# Patient Record
(demographics unavailable — no encounter records)

---

## 2025-04-22 NOTE — DISCUSSION/SUMMARY
[de-identified] : I spoke with the urgent care provider, reviewed notes, and images.  The patients condition is acute.  Confounding medical conditions/concerns: None  Tests/Studies Independently Interpreted Today: MRI right shoulder (PA 4/12/25) reveals evidence of mild anterior capsule sprain and bone marrow edema about proximal humerus, no large displaced labral repair.   We reviewed the MRI findings and discussed their diagnosis and treatment options at length including the risks and benefits of both surgical and non-surgical options for the patients anterior capsule sprain given no large labral tearing.  Cautiously optimistic that this will heal and resolve through proper rest and rehab. Some of his pain is likely also related to his proximal humeral physis, especially given contact injury to effected area.    We discussed the indication of a possible anterior Bankart tearing of which may not have been picked up on with a non-contrast story. The patient will continue on a month course of rehab and oral NSAIDs, if his condition is not significantly improving despite such modalities we will discuss obtaining an mr  arthrogram.   Physical therapy is prescribed for 3x a week for 4-6 weeks until re- evaluation , for pain relief and to decrease inflammation and to  strengthen  He will remain out of rugby for the remainder of the week into the weekend, start to advance back into practice next week and game next weekend. pain is his guide.   Discontinue use of sling  He may work in non-contact strengthening & conditioning as of Tuesday, return to contact next Friday  Continue PRN use of previously prescribed Naproxen 500mg, discussed risks of side effects, as well as timing/management of medication.  Side effects can include but are not limited to gastrointestinal ulcers and irritation, kidney failure, and bleeding issues. Use as directed and take with food to manage pain, inflammation, and discomfort.  Follow up in one week to re-evaluate condition

## 2025-04-22 NOTE — RETURN TO WORK/SCHOOL
[FreeTextEntry1] :    Diagnosis: right shoulder anterior capsule strain & proximal humerus apophysitis    X: As of 4/22/25 the patient may participate in non-contact gym & sports - he may work on strengthening & conditioning. He may return to contact gym/sports as of 4/25/25   Sincerely, Sidney Reyes MD Co- Chief Sports Medicine [facility] Medical Director [facility] and [facility]  [facility] [employer]

## 2025-04-22 NOTE — RETURN TO WORK/SCHOOL
[FreeTextEntry1] :    Diagnosis: right shoulder anterior capsule strain & proximal humerus apophysitis    X: As of 4/22/25 the patient may participate in non-contact gym & sports - he may work on strengthening & conditioning. He may return to contact gym/sports as of 4/25/25   Sincerely, Sidney Reyes MD Co- Chief Sports Medicine [facility] Medical Director [facility] and [facility]  [facility] [employer]

## 2025-04-22 NOTE — DATA REVIEWED
[MRI] : MRI normal balance [Right] : of the right [Shoulder] : shoulder [Report was reviewed and noted in the chart] : The report was reviewed and noted in the chart [I independently reviewed and interpreted images and report] : I independently reviewed and interpreted images and report [I reviewed the films/CD] : I reviewed the films/CD [FreeTextEntry1] : MRI right shoulder (PA 4/12/25) reveals evidence of mild anterior capsule sprain and bone marrow edema about proximal humerus, no large displaced labral repair 15

## 2025-04-22 NOTE — DISCUSSION/SUMMARY
[de-identified] : I spoke with the urgent care provider, reviewed notes, and images.  The patients condition is acute.  Confounding medical conditions/concerns: None  Tests/Studies Independently Interpreted Today: MRI right shoulder (PA 4/12/25) reveals evidence of mild anterior capsule sprain and bone marrow edema about proximal humerus, no large displaced labral repair.   We reviewed the MRI findings and discussed their diagnosis and treatment options at length including the risks and benefits of both surgical and non-surgical options for the patients anterior capsule sprain given no large labral tearing.  Cautiously optimistic that this will heal and resolve through proper rest and rehab. Some of his pain is likely also related to his proximal humeral physis, especially given contact injury to effected area.    We discussed the indication of a possible anterior Bankart tearing of which may not have been picked up on with a non-contrast story. The patient will continue on a month course of rehab and oral NSAIDs, if his condition is not significantly improving despite such modalities we will discuss obtaining an mr  arthrogram.   Physical therapy is prescribed for 3x a week for 4-6 weeks until re- evaluation , for pain relief and to decrease inflammation and to  strengthen  He will remain out of rugby for the remainder of the week into the weekend, start to advance back into practice next week and game next weekend. pain is his guide.   Discontinue use of sling  He may work in non-contact strengthening & conditioning as of Tuesday, return to contact next Friday  Continue PRN use of previously prescribed Naproxen 500mg, discussed risks of side effects, as well as timing/management of medication.  Side effects can include but are not limited to gastrointestinal ulcers and irritation, kidney failure, and bleeding issues. Use as directed and take with food to manage pain, inflammation, and discomfort.  Follow up in one week to re-evaluate condition

## 2025-04-22 NOTE — HISTORY OF PRESENT ILLNESS
[de-identified] :  leida lyons 14y/o M here with RT shoulder pain starting 4/11/25 from impact during a rugby game. Using a sling with some relief. Tried ice and naproxen with minimal relief. Pt states pain today is a 6 and has xrays per ELINOR GUILLAUME and MRI from [facility].

## 2025-04-22 NOTE — PHYSICAL EXAM
1.) Medication changes:   Increase Levemir to 25 units every evening  Continue Trulicity, metformin and glipizide     Schedule with EYE doctor soon.      2.) Test blood sugar: two times a day.  Record your blood sugars.  Report your recorded blood sugars to clinic in 1-2 weeks via phone, mail, or [facility].    3.) Diet: 45-60 grams of carbohydrates/meal  4.) Exercise:  At least 150 minutes a week     A1c:    Hemoglobin A1C (%)   Date Value   07/06/2022 6.7 (H)   07/12/2021 7.3 (H)     Hemoglobin A1C, POC (%)   Date Value   11/21/2022 6.9 (A)   03/23/2022 7.2 (A)     Goal A1c: Less than 7%  Pre meal blood glucose goals:    2 hour post meal blood glucose goal: Less than 180    Lipids:    LDL (mg/dL)   Date Value   01/07/2022 56   01/08/2021 75     Goal LDL:  Less than 70 (goal per AHA/ACC recommendation)    Urine test for protein:    Microalbumin/ Creatinine Ratio (mg/g)   Date Value   01/12/2022 9.0      Goal:  Less than 30.    Blood pressure:    Visit Vitals  /58   Pulse 73   Ht 5' 10\" (1.778 m)   Wt 115.7 kg (255 lb)   SpO2 97%   BMI 36.59 kg/m²      Goal:  Less than 140/90    Eye exam:  Obtain eye exam every year.    Foot exam:  Recommend daily foot inspection and care. Do not to go barefoot and avoid foot wear that pinches the foot or toes.    Flu shot:  Obtain flu vaccine every year.    Dental:  Visit the dentist twice a year or once a year if have dentures.      Treatment of low blood sugar.  Rule of 15's: If blood sugar is less than 70, eat or drink 15 grams of fast acting carbohydrate (1/2 cup juice, 1/2 cup regular soda, 4 glucose tab, etc.) every 15 minutes until blood sugar is over 70.  Eat next meal or small snack containing carbohydrate and protein.    For severe low blood sugar: If unable to eat or unconscious, you may be given a glucagon injection (prescription kit) by a family member or friend.    Treatment of high blood sugar.  Test blood sugar regularly. Call healthcare provider  if you have more than two consecutive numbers over 250, if there is any nausea, vomiting or signs of dehydration.  If after clinic hours, weekends or holidays to call your primary care provider.      Take diabetes medications and drink sugar free fluids.  Test for urine ketones if you have been prescribed ketone test strips. Call provider if you have moderate or large ketones.  If after clinic hours, weekends or holidays to call your primary care provider.    Please be aware that if you have an active [facility] account, you will be able to see your laboratory test results before Dr. Mahoney, Dr. Connors, or Ania Whyte NP have reviewed and advised upon them. Please allow the ordering provider time to review your results and make recommendations. If you are concerned about your test results, or are concerned about the length of time you are waiting to hear back from the clinic, please feel free to call and leave a message. We are always happy to hear from you.    Pranay, it was our pleasure serving you today. We thank you for choosing [facility] as your health care provider.    Because we value your input, you may receive a survey in the mail/email. Our priority is to provide you with excellent care and services so please take the time to complete the survey and return it. Please be aware that you do not have to answer survey questions that did not apply to your office visit. Your answers will help us change and grow to provide exceptional service. Thank you in advance for your assistance in helping [facility] build a stronger care team.    Thank you for your trust and confidence in us!        [Right] : right shoulder [Sitting] : sitting [Mild] : mild [4 ___] : forward flexion 4[unfilled]/5 [4___] : abduction 4[unfilled]/5 [] : no sensory deficits [TWNoteComboBox4] : passive forward flexion 170 degrees [TWNoteComboBox9] : passive abduction 170 degrees

## 2025-04-22 NOTE — HISTORY OF PRESENT ILLNESS
[de-identified] :  leida lyons 14y/o M here with RT shoulder pain starting 4/11/25 from impact during a rugby game. Using a sling with some relief. Tried ice and naproxen with minimal relief. Pt states pain today is a 6 and has xrays per ELINOR GUILLAUME and MRI from [facility].

## 2025-04-22 NOTE — PHYSICAL EXAM
[Right] : right shoulder [Sitting] : sitting [Mild] : mild [4 ___] : forward flexion 4[unfilled]/5 [4___] : abduction 4[unfilled]/5 [] : no sensory deficits [TWNoteComboBox4] : passive forward flexion 170 degrees [TWNoteComboBox9] : passive abduction 170 degrees

## 2025-04-22 NOTE — DATA REVIEWED
[MRI] : MRI [Right] : of the right [Shoulder] : shoulder [Report was reviewed and noted in the chart] : The report was reviewed and noted in the chart [I independently reviewed and interpreted images and report] : I independently reviewed and interpreted images and report [I reviewed the films/CD] : I reviewed the films/CD [FreeTextEntry1] : MRI right shoulder (PA 4/12/25) reveals evidence of mild anterior capsule sprain and bone marrow edema about proximal humerus, no large displaced labral repair

## 2025-04-29 NOTE — RETURN TO WORK/SCHOOL
[FreeTextEntry1] :    Diagnosis: right shoulder anterior capsule strain    X: Patient may return to gym/sports May 08, 2025     X: Patient may return to full activities May 08, 2025     Sincerely, Sidney Reyes MD Co- Chief Sports Medicine [facility] Medical Director [facility] and [facility]  [facility] [facility] [employer]

## 2025-04-29 NOTE — DISCUSSION/SUMMARY
[de-identified] : I spoke with VIKA Dudley, reviewed notes, and images.  The patients condition is acute.  Confounding medical conditions/concerns: None  Tests/Studies Independently Interpreted Today: Re-reviewed MRI right shoulder (PA 4/12/25) revealing evidence of mild anterior capsule sprain and bone marrow edema about proximal humerus, no large displaced labral repair.   At this time, the patients condition is improving on a gradual, interval basis. We recommended he continue on current course treating his anterior capsule sprain through rehab and oral NSAIDs before ordering an arthrogram - may aggravate his shoulder joint. We discussed the indication of a possible anterior Bankart tearing of which may not have been picked up on with a non-contrast story. If his condition regresses, we will obtain an arthrogram.   Continue physical therapy, attending 2-3x/week for approximately 4-6 weeks   The patient was advised to let pain guide the gradual advancement of activities. He will remain out of gym & sports until next Thursday, thereafter they have no activity restrictions at this time. Discussed the importance of increasing activity on an interval basis.   Continue PRN use of previously prescribed Naproxen 500mg, discussed risks of side effects, as well as timing/management of medication.  Side effects can include but are not limited to gastrointestinal ulcers and irritation, kidney failure, and bleeding issues. Use as directed and take with food to manage pain, inflammation, and discomfort.  Follow up in 2-3 weeks, if discomfort persists we will order an MR-A

## 2025-04-29 NOTE — RETURN TO WORK/SCHOOL
[FreeTextEntry1] :    Diagnosis: right shoulder anterior capsule strain    X: Patient may return to gym/sports May 08, 2025     X: Patient may return to full activities May 08, 2025     Sincerely, Sidney Reyes MD Co- Chief Sports Medicine [facility] Medical Director [facility] and [facility]  [employer] [facility] [employer]

## 2025-04-29 NOTE — HISTORY OF PRESENT ILLNESS
[de-identified] : leida Nagy 15 y/o male complaining of Rt shoulder pain 04/11/25. Pt has been attending physical therapy 3x a week at [facility] per ELINOR Larson. Pain scale 5/10. Pt states limited range of motion and pain in Rt shoulder when lifting RT arm. Pt is no longer taking naproxen.

## 2025-04-29 NOTE — DISCUSSION/SUMMARY
[de-identified] : I spoke with VIKA Dudley, reviewed notes, and images.  The patients condition is acute.  Confounding medical conditions/concerns: None  Tests/Studies Independently Interpreted Today: Re-reviewed MRI right shoulder (PA 4/12/25) revealing evidence of mild anterior capsule sprain and bone marrow edema about proximal humerus, no large displaced labral repair.   At this time, the patients condition is improving on a gradual, interval basis. We recommended he continue on current course treating his anterior capsule sprain through rehab and oral NSAIDs before ordering an arthrogram - may aggravate his shoulder joint. We discussed the indication of a possible anterior Bankart tearing of which may not have been picked up on with a non-contrast story. If his condition regresses, we will obtain an arthrogram.   Continue physical therapy, attending 2-3x/week for approximately 4-6 weeks   The patient was advised to let pain guide the gradual advancement of activities. He will remain out of gym & sports until next Thursday, thereafter they have no activity restrictions at this time. Discussed the importance of increasing activity on an interval basis.   Continue PRN use of previously prescribed Naproxen 500mg, discussed risks of side effects, as well as timing/management of medication.  Side effects can include but are not limited to gastrointestinal ulcers and irritation, kidney failure, and bleeding issues. Use as directed and take with food to manage pain, inflammation, and discomfort.  Follow up in 2-3 weeks, if discomfort persists we will order an MR-A

## 2025-04-29 NOTE — HISTORY OF PRESENT ILLNESS
[de-identified] : leida Nagy 15 y/o male complaining of Rt shoulder pain 04/11/25. Pt has been attending physical therapy 3x a week at [facility] per ELINOR Larson. Pain scale 5/10. Pt states limited range of motion and pain in Rt shoulder when lifting RT arm. Pt is no longer taking naproxen.

## 2025-04-29 NOTE — PHYSICAL EXAM
[Right] : right shoulder [NL (0-180)] : full passive abduction 0-180 degrees [Sitting] : sitting [Mild] : mild [5 ___] : forward flexion 5[unfilled]/5 [5___] : abduction 5[unfilled]/5 [] : no sensory deficits [TWNoteComboBox4] : False [TWNoteComboBox9] : False

## 2025-05-31 NOTE — PHYSICAL EXAM
[Right] : right shoulder [NL (0-180)] : full passive abduction 0-180 degrees [Sitting] : sitting [] : negative [place] [FreeTextEntry8] : nontender over coracoid

## 2025-05-31 NOTE — HISTORY OF PRESENT ILLNESS
[de-identified] : Pt is here for a follow up on the right shoulder. Doing good, no pain. Just finished [facility] for [facility]. Good ROM.

## 2025-05-31 NOTE — RETURN TO WORK/SCHOOL
[FreeTextEntry1] :   To whom it may concern,  Diagnosis: anterior capsule sprain   _ Patient may return to work: _ Patient may not return to work until: _ Patient may return to school: _ Patient cannot participate in gym/sports until: x_ Patient may return to gym/sports on 5/21/25 _ Patient is on limited weight bearing: _ Patient may return to full activities: _ Patient requires early release from class/elevator pass for (or until):   SPECIAL INSTRUCTIONS:   Please excuse the patient,AMNDEEP SHIRLEY ,   as He was seen in our office today, 05/21/2025 under the care of Dr. Reyes   _ Full duty, no restrictions. _ Light duty, as follows: _ Limited duty, as follows: _ With Cast   _  With Brace   _ With Crutches   _ With Boot     Sincerely,   Sidney Reyes MD Co- Chief Sports Medicine [facility] Medical Director [facility]  [facility] [employer]

## 2025-05-31 NOTE — RETURN TO WORK/SCHOOL
[FreeTextEntry1] :   To whom it may concern,  Diagnosis: anterior capsule sprain   _ Patient may return to work: _ Patient may not return to work until: _ Patient may return to school: _ Patient cannot participate in gym/sports until: x_ Patient may return to gym/sports on 5/21/25 _ Patient is on limited weight bearing: _ Patient may return to full activities: _ Patient requires early release from class/elevator pass for (or until):   SPECIAL INSTRUCTIONS:   Please excuse the patient,MANDEEP SHIRLEY ,   as He was seen in our office today, 05/21/2025 under the care of Dr. Reyes   _ Full duty, no restrictions. _ Light duty, as follows: _ Limited duty, as follows: _ With Cast   _  With Brace   _ With Crutches   _ With Boot     Sincerely,   Sidney Reyes MD Co- Chief Sports Medicine [facility] Medical Director [facility]  [facility] [employer]

## 2025-05-31 NOTE — DISCUSSION/SUMMARY
[de-identified] : The patients condition is acute.  Confounding medical conditions/concerns: None  Tests/Studies Independently Interpreted Today: Reviewed previous notes & images  ------------------------------------------------------------------------------------------------------------------   Discussed continued treatment options for the pt's anterior capsule sprain considering he has not noted any instanced of recent instability and is having minimal pain  The patient continues to improve on a gradual, interval basis reporting no recurrent symptoms or instability and has noted improvement of the shoulder over the last couple of weeks. Grossly benign physical examination upon office visit. At this time, the patient has no activity restrictions and may continue to advance activity as pain permits.  Discussed the risk of continued instability especially with return to rugby Prescribed physical therapy today to hep aid in his return, the patient will attend 2-3x/week for approximately 4-6 weeks  Pt can return to gym and sports- rec a gradual return back to activity Discussed with pt and parent was considering MR -arthrogram to further eval for subtle labral tear but will hold off on this since pain has improved Prescribed the patient Motrin 600mgs T.I.D and discussed risks of side effects and timing and management of medication. Side effects include but are not limited to gi ulcers and irritation, as well as kidney failure and bleeding issues.  I, Ekaterina Adkins, attest that this documentation has been prepared under the direction and in the presence of Provider Dr. Sidney Reyes

## 2025-05-31 NOTE — DISCUSSION/SUMMARY
[de-identified] : The patients condition is acute.  Confounding medical conditions/concerns: None  Tests/Studies Independently Interpreted Today: Reviewed previous notes & images  ------------------------------------------------------------------------------------------------------------------   Discussed continued treatment options for the pt's anterior capsule sprain considering he has not noted any instanced of recent instability and is having minimal pain  The patient continues to improve on a gradual, interval basis reporting no recurrent symptoms or instability and has noted improvement of the shoulder over the last couple of weeks. Grossly benign physical examination upon office visit. At this time, the patient has no activity restrictions and may continue to advance activity as pain permits.  Discussed the risk of continued instability especially with return to rugby Prescribed physical therapy today to hep aid in his return, the patient will attend 2-3x/week for approximately 4-6 weeks  Pt can return to gym and sports- rec a gradual return back to activity Discussed with pt and parent was considering MR -arthrogram to further eval for subtle labral tear but will hold off on this since pain has improved Prescribed the patient Motrin 600mgs T.I.D and discussed risks of side effects and timing and management of medication. Side effects include but are not limited to gi ulcers and irritation, as well as kidney failure and bleeding issues.  I, Ekaterina Adkins, attest that this documentation has been prepared under the direction and in the presence of Provider Dr. Sidney Reyes

## 2025-05-31 NOTE — HISTORY OF PRESENT ILLNESS
[de-identified] : Pt is here for a follow up on the right shoulder. Doing good, no pain. Just finished [facility] for [facility]. Good ROM.

## 2025-05-31 NOTE — PHYSICAL EXAM
[Right] : right shoulder [NL (0-180)] : full passive abduction 0-180 degrees [Sitting] : sitting [] : negative [place] [FreeTextEntry8] : nontender over coracoid